# Patient Record
Sex: FEMALE | Race: BLACK OR AFRICAN AMERICAN | NOT HISPANIC OR LATINO | URBAN - METROPOLITAN AREA
[De-identification: names, ages, dates, MRNs, and addresses within clinical notes are randomized per-mention and may not be internally consistent; named-entity substitution may affect disease eponyms.]

---

## 2021-12-22 ENCOUNTER — EMERGENCY (EMERGENCY)
Facility: HOSPITAL | Age: 76
LOS: 1 days | Discharge: HOME | End: 2021-12-22
Attending: EMERGENCY MEDICINE | Admitting: EMERGENCY MEDICINE
Payer: MEDICARE

## 2021-12-22 VITALS
TEMPERATURE: 97 F | HEART RATE: 80 BPM | DIASTOLIC BLOOD PRESSURE: 74 MMHG | OXYGEN SATURATION: 96 % | SYSTOLIC BLOOD PRESSURE: 120 MMHG | RESPIRATION RATE: 18 BRPM

## 2021-12-22 VITALS
SYSTOLIC BLOOD PRESSURE: 135 MMHG | OXYGEN SATURATION: 95 % | RESPIRATION RATE: 16 BRPM | WEIGHT: 179.02 LBS | HEART RATE: 84 BPM | HEIGHT: 61 IN | DIASTOLIC BLOOD PRESSURE: 87 MMHG | TEMPERATURE: 98 F

## 2021-12-22 DIAGNOSIS — M54.50 LOW BACK PAIN, UNSPECIFIED: ICD-10-CM

## 2021-12-22 DIAGNOSIS — M79.605 PAIN IN LEFT LEG: ICD-10-CM
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
## 2021-12-22 DIAGNOSIS — M79.604 PAIN IN RIGHT LEG: ICD-10-CM

## 2021-12-22 DIAGNOSIS — I50.9 HEART FAILURE, UNSPECIFIED: ICD-10-CM

## 2021-12-22 DIAGNOSIS — J45.909 UNSPECIFIED ASTHMA, UNCOMPLICATED: ICD-10-CM

## 2021-12-22 DIAGNOSIS — I11.0 HYPERTENSIVE HEART DISEASE WITH HEART FAILURE: ICD-10-CM

## 2021-12-22 PROCEDURE — 94640 AIRWAY INHALATION TREATMENT: CPT

## 2021-12-22 PROCEDURE — 99283 EMERGENCY DEPT VISIT LOW MDM: CPT | Mod: 25

## 2021-12-22 PROCEDURE — 99284 EMERGENCY DEPT VISIT MOD MDM: CPT

## 2021-12-22 RX ORDER — ALBUTEROL 90 UG/1
1 AEROSOL, METERED ORAL ONCE
Refills: 0 | Status: COMPLETED | OUTPATIENT
Start: 2021-12-22 | End: 2021-12-22

## 2021-12-22 RX ORDER — IBUPROFEN 200 MG
600 TABLET ORAL ONCE
Refills: 0 | Status: COMPLETED | OUTPATIENT
Start: 2021-12-22 | End: 2021-12-22

## 2021-12-22 RX ORDER — TRAMADOL HYDROCHLORIDE 50 MG/1
25 TABLET ORAL ONCE
Refills: 0 | Status: DISCONTINUED | OUTPATIENT
Start: 2021-12-22 | End: 2021-12-22

## 2021-12-22 RX ORDER — ACETAMINOPHEN 500 MG
650 TABLET ORAL ONCE
Refills: 0 | Status: DISCONTINUED | OUTPATIENT
Start: 2021-12-22 | End: 2021-12-22

## 2021-12-22 RX ADMIN — Medication 600 MILLIGRAM(S): at 14:25

## 2021-12-22 RX ADMIN — ALBUTEROL 1 PUFF(S): 90 AEROSOL, METERED ORAL at 18:48

## 2021-12-22 RX ADMIN — TRAMADOL HYDROCHLORIDE 25 MILLIGRAM(S): 50 TABLET ORAL at 14:26

## 2021-12-22 NOTE — ED ADULT NURSE NOTE - OBJECTIVE STATEMENT
Pt BIBEMS for b/l lower back pain and b/l LE pain s/p fall today. Pt states she fell because "legs gave out," landed on her back, denies LOC or head strike. H/o chronic lower back pain. Pt ambulates with a cane or walker. Pt also endorses multiple falls this week. No bleeding or head injury noted. Fall and safety precautions in place.

## 2021-12-22 NOTE — ED ADULT TRIAGE NOTE - OTHER COMPLAINTS
multiple falls x5 days. Reports due to pain in back and legs, legs gave out and sutained fall today. No loc nor head injury. no incontinence.

## 2021-12-22 NOTE — ED ADULT NURSE REASSESSMENT NOTE - NS ED NURSE REASSESS COMMENT FT1
Patient a/oX3, pain resolved s/p meds, ambulates w/ steady gait, no new symptom complaint.  Vital sign stable.  Discharged to home in stable condition.

## 2021-12-22 NOTE — ED PROVIDER NOTE - CLINICAL SUMMARY MEDICAL DECISION MAKING FREE TEXT BOX
75 y/o F w/ Hx arthritis w/ chronic back, LE pain presents today s/p fall wherein legs gave out and patient landed on buttock, no head trauma or LOC. Reports pain to LE and back, although not worsened from chronic pain in ED. Plan to give tramadol, ibuprofen, reevaluate. Plan to admit if patient unable to walk s/p pain control. 75 y/o F w/ Hx asthma, HTN, DM, arthritis w/ chronic back, LE pain presents today s/p fall wherein legs gave out and patient landed on buttock, no head trauma or LOC. Reports pain to LE and back, although not worsened from chronic pain in ED. Plan to give tramadol, ibuprofen, reevaluate. Plan to admit if patient unable to walk s/p pain control.    requesting alb inhaler - gvenm 75 y/o F w/ Hx asthma, HTN, DM, arthritis w/ chronic back, LE pain presents today s/p fall wherein legs gave out and patient landed on buttock, no head trauma or LOC. Reports pain to LE and back, although not worsened from chronic pain in ED. Plan to give tramadol, ibuprofen, reevaluate. Plan to admit if patient unable to walk s/p pain control.  ED course: VS noted. Pt ambulating steadily in ED with cane after pain meds and reports feeling better. While in ED pt requesting alb inhaler which was given. Ambulette was called to get pt back to her home but pt eloped from ED because she was waiting too long for an ambulette

## 2021-12-22 NOTE — ED PROVIDER NOTE - NSFOLLOWUPINSTRUCTIONS_ED_ALL_ED_FT
Please follow up with your primary care doctor in 3-5 days.  Return to the ER if you develop any concerning symptoms.       Fall Prevention    WHAT YOU NEED TO KNOW:    Fall prevention includes ways to make your home and other areas safer. It also includes ways you can move more carefully to prevent a fall. Health conditions that cause changes in your blood pressure, vision, or muscle strength and coordination may increase your risk for falls. Medicines may also increase your risk for falls if they make you dizzy, weak, or sleepy.    DISCHARGE INSTRUCTIONS:    Call 911 or have someone else call if:   •You have fallen and are unconscious.      •You have fallen and cannot move part of your body.      Contact your healthcare provider if:   •You have fallen and have pain or a headache.      •You have questions or concerns about your condition or care.      Fall prevention tips:   •Stand or sit up slowly. This may help you keep your balance and prevent falls.      •Use assistive devices as directed. Your healthcare provider may suggest that you use a cane or walker to help you keep your balance. You may need to have grab bars put in your bathroom near the toilet or in the shower.      •Wear shoes that fit well and have soles that . Wear shoes both inside and outside. Use slippers with good . Do not wear shoes with high heels.      •Wear a personal alarm. This is a device that allows you to call 911 if you fall and need help. Ask your healthcare provider for more information.      •Stay active. Exercise can help strengthen your muscles and improve your balance. Your healthcare provider may recommend water aerobics or walking. He or she may also recommend physical therapy to improve your coordination. Never start an exercise program without talking to your healthcare provider first.  Walking for Exercise           •Manage your medical conditions.  Keep all appointments with your healthcare providers. Visit your eye doctor as directed.      Home safety tips:     Fall Prevention for Adults     •Add items to prevent falls in the bathroom. Put nonslip strips on your bath or shower floor to prevent you from slipping. Use a bath mat if you do not have carpet in the bathroom. This will prevent you from falling when you step out of the bath or shower. Use a shower seat so you do not need to stand while you shower. Sit on the toilet or a chair in your bathroom to dry yourself and put on clothing. This will prevent you from losing your balance from drying or dressing yourself while you are standing.      •Keep paths clear. Remove books, shoes, and other objects from walkways and stairs. Place cords for telephones and lamps out of the way so that you do not need to walk over them. Tape them down if you cannot move them. Remove small rugs. If you cannot remove a rug, secure it with double-sided tape. This will prevent you from tripping.      •Install bright lights in your home. Use night lights to help light paths to the bathroom or kitchen. Always turn on the light before you start walking.      •Keep items you use often on shelves within reach. Do not use a step stool to help you reach an item.      •Paint or place reflective tape on the edges of your stairs. This will help you see the stairs better.      Follow up with your doctor as directed: Write down your questions so you remember to ask them during your visits.        © Copyright [employer] 2021

## 2021-12-22 NOTE — ED PROVIDER NOTE - OBJECTIVE STATEMENT
75 y/o F with PMHx of arthritis to b/l LE w/ chronic LE pain, HTN, CHF, presents to the ED s/p fall today. Patient was walking this morning with her cane (which she started using a few months ago), walked to a store outside of her senior care home where she felt her legs give out and she fell, hitting her buttock. Bystanders helped her stand up and patient sat down, EMS called and brought patient to the ED. Reports b/l LE and back pain at that time. No LOC or head trauma. Reports similar episodes happened twice this week while she was inside her senior care home. In ED, reports pain not worsened from baseline chronic pains. Denies numbness, tingling, weakness, chest pain, SOB, abdominal pain, fever, chills. Vaccinated for COVID19. 77 y/o F with PMHx of arthritis to b/l LE w/ chronic LE pain, HTN, CHF, asthma presents to the ED s/p fall today. Patient was walking this morning with her cane (which she started using a few months ago), walked to a store outside of her senior care home where she felt her legs give out and she fell, hitting her buttock. Bystanders helped her stand up and patient sat down, EMS called and brought patient to the ED. Reports b/l LE and back pain at that time. No LOC or head trauma. Reports similar episodes happened twice this week while she was inside her senior care home. In ED, reports pain not worsened from baseline chronic pains. Denies numbness, tingling, weakness, chest pain, SOB, abdominal pain, fever, chills. Vaccinated for COVID19. 77 y/o F with PMHx of arthritis to b/l LE w/ chronic LE pain, HTN, CHF, asthma presents to the ED s/p fall today. Patient was walking this morning with her cane (which she started using a several months ago sec to joint pain), walked to a store outside of her senior care home where she felt her legs give out and she fell, hitting her buttock. Bystanders helped her stand up and patient sat down, EMS called and brought patient to the ED. Reports b/l LE and back pain at that time. No LOC or head trauma. Reports similar episodes happened twice this week while she was inside her senior care home with no head strike or LOC. In ED, reports pain not worsened from baseline chronic pain. Denies numbness, tingling, weakness, chest pain, SOB, abdominal pain, fever, chills. Vaccinated for COVID19.

## 2021-12-22 NOTE — ED ADULT NURSE REASSESSMENT NOTE - NS ED NURSE REASSESS COMMENT FT1
Pt walked out of ER without announcing. Pt A&Ox3, able to ambulate with steady gait and with cane assistance.

## 2021-12-22 NOTE — ED PROVIDER NOTE - NSFOLLOWUPCLINICS_GEN_ALL_ED_FT
[facility]  Family Medicine  [address]  [place], [place] [address]  Phone: [phone]  Fax:   Follow Up Time: 4-6 Days

## 2021-12-22 NOTE — ED PROVIDER NOTE - PHYSICAL EXAMINATION
CONSTITUTIONAL: Well appearing, awake, alert, oriented and in no apparent distress.  ENMT: Airway patent.  EYES: Clear bilaterally.  CARDIAC: Normal rate, regular rhythm.  Heart sounds S1, S2.   RESPIRATORY: Breath sounds clear and equal bilaterally.  GASTROINTESTINAL: Abdomen soft, non-tender, no guarding.  MUSCULOSKELETAL: 5/5 strength to b/l LE. Spine appears normal, range of motion is not limited, no muscle or joint tenderness  NEUROLOGICAL: Alert and oriented, no focal deficits, no motor or sensory deficits.  SKIN: Skin normal color for race, warm, dry and intact. No evidence of rash.  PSYCHIATRIC: Alert and oriented. normal mood and affect. no apparent risk to self or others. CONSTITUTIONAL: Well appearing, awake, alert, oriented and in no apparent distress.  ENMT: Airway patent.  EYES: Clear bilaterally.  CARDIAC: Normal rate, regular rhythm.  Heart sounds S1, S2.   RESPIRATORY: Breath sounds clear and equal bilaterally.  GASTROINTESTINAL: Abdomen soft, non-tender, no guarding.  MUSCULOSKELETAL: No midline spine TTP. 5/5 strength to b/l LE. Spine appears normal, range of motion is not limited, no muscle or joint tenderness  NEUROLOGICAL: Alert and oriented, no focal deficits, no motor or sensory deficits.  SKIN: Skin normal color for race, warm, dry and intact. No evidence of rash.  PSYCHIATRIC: Alert and oriented. normal mood and affect. no apparent risk to self or others.

## 2021-12-22 NOTE — ED PROVIDER NOTE - PATIENT PORTAL LINK FT
You can access the FollowMyHealth Patient Portal offered by [facility] by registering at the following website: [email or web address]. By joining [facility] FollowMyHealth portal, you will also be able to view your health information using other applications (apps) compatible with our system.

## 2024-06-04 NOTE — ED ADULT NURSE NOTE - OTHER COMPLAINTS
Incoming refill request on patient's medication:    Medication: Outpatient Medication Detail     Disp Refills Start End    sertraline (ZOLOFT) 100 MG tablet 30 tablet 0 4/23/2024 --    Sig - Route: TAKE 1 TABLET BY MOUTH DAILY - Oral     passed protocol.     Last office visit date: 1-15-24  Next appointment scheduled?: No  Number of refills given: 30 days     Prescriber's Follow Up Recommendation:  1 months     No Show/Late Cancels in Last 12 Months: 2-12-24     Next Visit Date: Visit date not found    Verified dosage(s) against: Prescriber's last note    Action Taken: Sent to [facility] pool and sent to provider for review   multiple falls x5 days. Reports due to pain in back and legs, legs gave out and sutained fall today. No loc nor head injury. no incontinence.